# Patient Record
Sex: FEMALE | Race: WHITE | NOT HISPANIC OR LATINO | Employment: OTHER | URBAN - METROPOLITAN AREA
[De-identification: names, ages, dates, MRNs, and addresses within clinical notes are randomized per-mention and may not be internally consistent; named-entity substitution may affect disease eponyms.]

---

## 2024-07-18 PROBLEM — M06.9 RHEUMATOID ARTHRITIS: Status: ACTIVE | Noted: 2024-07-18

## 2024-07-22 ENCOUNTER — OFFICE VISIT (OUTPATIENT)
Age: 79
End: 2024-07-22
Payer: COMMERCIAL

## 2024-07-22 VITALS
HEIGHT: 63 IN | BODY MASS INDEX: 32.85 KG/M2 | TEMPERATURE: 97.2 F | HEART RATE: 73 BPM | DIASTOLIC BLOOD PRESSURE: 62 MMHG | SYSTOLIC BLOOD PRESSURE: 106 MMHG | WEIGHT: 185.4 LBS

## 2024-07-22 DIAGNOSIS — Z79.899 HIGH RISK MEDICATION USE: ICD-10-CM

## 2024-07-22 DIAGNOSIS — M06.0A RHEUMATOID ARTHRITIS OF OTHER SITE WITH NEGATIVE RHEUMATOID FACTOR: Primary | ICD-10-CM

## 2024-07-22 DIAGNOSIS — M85.89 OSTEOPENIA OF MULTIPLE SITES: ICD-10-CM

## 2024-07-22 DIAGNOSIS — N28.9 RENAL INSUFFICIENCY: ICD-10-CM

## 2024-07-22 NOTE — ASSESSMENT & PLAN NOTE
Orencia -- well tolerated and effective.  CXR normal 6/2017.  QFN  negative 10/2022  S/p Covid vaccines + 1 booster. S/p bivalent. Fall 23 booster    Plan:  CBC, CMP every 4 months 3/24    Would hold Orencia for any infection or illness, Seek treatment and when well and illness is resolved you may restart medication.  Hold Orencia 2 weeks after covid booster.

## 2024-07-22 NOTE — ASSESSMENT & PLAN NOTE
2/17 DXA was stable except femoral neck had decreased bone density by 8.8%  ? T11 height decrease by the FA vs artifact; previous Tspine film had been negative.'  Dexa 4/27/21 L femoral neck -1.6 decreased 5% , Total Hip 0.4 increased 1.4%  Spine average of L1 and L4 is -1.3 but not useful secondary to limited vertebra. L forearm 1/3 value -0.7 , total -0.8- stable.    Plan:    Recommend Calcium 800-1000mg per day + 2000 Vit D per day  Weight bearing exercise-  possibly try chair aerobics or chair yoga on YouTube.  She is walking for exercise.   Repeat DEXA here on RTC.

## 2024-07-22 NOTE — ASSESSMENT & PLAN NOTE
S/p Enbrel  weekly and MTX 4 once weekly - tapered to 2 MTX per week.  NO MORE TNF DRUGS- Patient has idiopathic cardiomyopathy.  Doing well without sx.    Plan:  Continue Orencia Sub Q injections

## 2024-07-22 NOTE — PROGRESS NOTES
[facility] Follow Up Visit     Office Follow Up      Date: 07/22/2024   Patient Name: Lydia Smallwood  MRN: [id number]  YOB: 1945    Referring Physician: Jere Santos MD     Chief Complaint   Patient presents with    Follow-up       History of Present Illness: Lydia Smallwood is a 79 y.o. female who is here today for follow up on   History of Present Illness  The patient is a 79-year-old female who is here for follow-up of rheumatoid arthritis.    The patient reports a pain level of 3 out of 10, predominantly localized in her hands. She experiences morning stiffness lasting approximately 5 minutes. Overall, she perceives her rheumatoid arthritis to be well-managed. She self-administers Orencia injections, which she tolerates well. She infrequently uses tramadol for pain management, typically for hip pain, as prescribed by Dr. Emerson. She is due for a bone density scan and maintains a regimen of calcium and vitamin D supplements. Her mobility is limited, with good and bad days. She engages in regular walking exercises. She requires a prescription for Orencia. She reports no adverse effects from Enbrel.    The patient has recently developed a cough, suspected to be a result of heart failure. She is scheduled for an ultrasound of her kidneys due to elevated levels during her consultation with Dr. Emerson. Her cardiologist, Dr. Wong, has expressed concerns about potential kidney complications.       Result Review :        Results  Laboratory Studies  Creatinine was 1.32. GFR was 41.          Subjective     Allergies   Allergen Reactions    Penicillins Unknown - High Severity    Sulfa Antibiotics Unknown - High Severity         Current Outpatient Medications:     Abatacept (Orencia ClickJect) 125 MG/ML solution auto-injector, Inject 1 mL under the skin into the appropriate area as directed Every 7 (Seven) Days. REFRIGERATE. ALLOW TO WARM TO  ROOM TEMPERATURE PRIOR TO ADMINISTRATION., Disp: , Rfl:     aspirin 81 MG EC tablet, Take 1 tablet by mouth Daily., Disp: , Rfl:     carvedilol (COREG) 12.5 MG tablet, Take 1 tablet by mouth 2 (Two) Times a Day With Meals., Disp: , Rfl:     Cholecalciferol (Vitamin D3) 10 MCG (400 UNIT) chewable tablet, Chew 1 tablet Daily., Disp: , Rfl:     Evolocumab (Repatha SureClick) solution auto-injector SureClick injection, Inject 1 mL under the skin into the appropriate area as directed Every 14 (Fourteen) Days. in the abdomen, thigh, or outer area of upper arm (rotate sites), Disp: , Rfl:     levothyroxine (SYNTHROID, LEVOTHROID) 50 MCG tablet, Take 1 tablet by mouth Daily., Disp: , Rfl:     nitroglycerin (NITROSTAT) 0.4 MG SL tablet, Place 1 tablet under the tongue Every 5 (Five) Minutes As Needed for Chest Pain. Take no more than 3 doses in 15 minutes., Disp: , Rfl:     omeprazole (priLOSEC) 40 MG capsule, Take 1 capsule by mouth Every Morning Before Breakfast., Disp: , Rfl:     sacubitril-valsartan (Entresto) 24-26 MG tablet, Take 1 tablet by mouth 2 (Two) Times a Day., Disp: , Rfl:     spironolactone (ALDACTONE) 25 MG tablet, Take 1 tablet by mouth Daily., Disp: , Rfl:     torsemide (DEMADEX) 10 MG tablet, Take 1 tablet by mouth Daily As Needed., Disp: , Rfl:     traMADol (ULTRAM) 50 MG tablet, Take 1 tablet by mouth Daily As Needed., Disp: , Rfl:     Triamcinolone Acetonide powder, Use., Disp: , Rfl:     Past Medical History:   Diagnosis Date    Abnormal laboratory test     CAD (coronary artery disease)     Degenerative arthritis     Dyspepsia     Fatigue     High risk medication use     Hyperlipidemia     Hypothyroid     Idiopathic cardiomyopathy     02/21/2019 -EF 45% ( ?? FH with grandmother and father). No CHF. Positive mitral regurgitation. Dr. Cardenas.    Myofascial pain     SG 10/11/2017 -Resolved    Nonspecific abnormal results of function study of liver     SG 10/11/2017 -Resolved    Osteopenia     Pes  planovalgus     Pneumonia     SG 10/11/2017 -Inactive    Posterior tibial tendonitis     Rheumatoid arthritis     Shingles 11/19/2021        Past Surgical History:   Procedure Laterality Date    CATARACT EXTRACTION Left 2023    ICD GENERATOR REPLACEMENT  10/29/2021    TOTAL HIP ARTHROPLASTY         Family History   Problem Relation Age of Onset    Lung cancer Mother     Heart disease Brother         Social History     Socioeconomic History    Marital status:     Number of children: 3   Tobacco Use    Smoking status: Never   Substance and Sexual Activity    Alcohol use: Never    Drug use: Never    Sexual activity: Defer       Review of Systems   Constitutional:  Positive for fatigue. Negative for fever.   HENT:  Negative for mouth sores, nosebleeds, swollen glands and trouble swallowing.    Eyes:  Negative for blurred vision, double vision, photophobia, pain and visual disturbance.   Respiratory:  Positive for cough and shortness of breath. Negative for apnea and choking.    Cardiovascular:  Negative for chest pain, palpitations and leg swelling.        Swelling   Gastrointestinal:  Negative for abdominal pain, blood in stool, constipation, diarrhea, nausea, vomiting and GERD.   Endocrine: Positive for heat intolerance. Negative for cold intolerance, polydipsia, polyphagia and polyuria.   Genitourinary:  Negative for difficulty urinating, dysuria, genital sores, hematuria and urinary incontinence.   Musculoskeletal:  Negative for arthralgias, back pain, gait problem, joint swelling, myalgias, neck pain, neck stiffness and bursitis.   Skin:  Negative for rash.   Allergic/Immunologic: Negative for environmental allergies and food allergies.   Neurological:  Negative for dizziness, tremors, seizures, syncope, weakness, numbness, headache, memory problem and confusion.   Hematological:  Negative for adenopathy. Does not bruise/bleed easily.   Psychiatric/Behavioral:  Negative for sleep disturbance, suicidal ideas,  "depressed mood and stress. The patient is not nervous/anxious.         I have reviewed and updated the patient's chief complaint, history of present illness, review of systems, past medical history, surgical history, family history, social history, medications and allergy list as appropriate.     Objective    Vital Signs:   Vitals:    07/22/24 1157   BP: 106/62   Pulse: 73   Temp: 97.2 °F (36.2 °C)   Weight: 84.1 kg (185 lb 6.4 oz)   Height: 160 cm (63\")   PainSc:   3   PainLoc: Hand     Lydia Smallwood reports a pain score of 3.  Given her pain assessment as noted, treatment options were discussed and the following options were decided upon as a follow-up plan to address the patient's pain: .      Body mass index is 32.84 kg/m².        Physical Exam   Physical Exam  Patient is an alert female, no acute distress.  Head is atraumatic, normocephalic. Pupils are round and equal. Extraocular muscles intact. Oropharynx looks negative except her tongue is slightly dry today.  Heart is regular without rubs, gallops, or murmurs.  Mild thoracic kyphosis is present. Spine and muscles are nontender. Shoulders are nontender with good range of motion. Few Heberden's nodes are present on the right hand and some mild first CMC squaring, but no synovitis or tenderness. Left hand is the same. First MTP thickening with hallux deformity bilaterally in the feet. Abduction and external rotation of the left hip causes mild pain.  Skin is negative other than some evidence of past sun exposure.    Physical Exam  There is currently no information documented on the homunculus. Go to the Rheumatology activity and complete the homunculus joint exam.     Results Review:   Imaging Results (Last 24 Hours)       ** No results found for the last 24 hours. **            Procedures    Assessment / Plan    Assessment/Plan:   Diagnoses and all orders for this visit:    1. Rheumatoid arthritis of other site with negative rheumatoid factor " (Primary)  Assessment & Plan:  S/p Enbrel  weekly and MTX 4 once weekly - tapered to 2 MTX per week.  NO MORE TNF DRUGS- Patient has idiopathic cardiomyopathy.  Doing well without sx.    Plan:  Continue Orencia Sub Q injections          2. Renal insufficiency  Assessment & Plan:  Since 2018 has ranged up to 1.37/40 in 2/23. ? related to diabetes, HTN, dieretic use.  Cr 1.37/40 in 2/23  Cr 0.99/59 in 6/23  Cr 1.20/47 in 11/23  Cr 1.12/50 in 3/24    Plan:  It may be prudent for her to see nephrology for baseline eval. PCP felt she did not need nephrology eval at this time.   Improved      3. Osteopenia of multiple sites  Assessment & Plan:  2/17 DXA was stable except femoral neck had decreased bone density by 8.8%  ? T11 height decrease by the FA vs artifact; previous Tspine film had been negative.'  Dexa 4/27/21 L femoral neck -1.6 decreased 5% , Total Hip 0.4 increased 1.4%  Spine average of L1 and L4 is -1.3 but not useful secondary to limited vertebra. L forearm 1/3 value -0.7 , total -0.8- stable.    Plan:    Recommend Calcium 800-1000mg per day + 2000 Vit D per day  Weight bearing exercise-  possibly try chair aerobics or chair yoga on YouTube.  She is walking for exercise.   Repeat DEXA here on RTC.            4. High risk medication use  Assessment & Plan:  Orencia -- well tolerated and effective.  CXR normal 6/2017.  QFN  negative 10/2022  S/p Covid vaccines + 1 booster. S/p bivalent. Fall 23 booster    Plan:  CBC, CMP every 4 months 3/24    Would hold Orencia for any infection or illness, Seek treatment and when well and illness is resolved you may restart medication.  Hold Orencia 2 weeks after covid booster.             Assessment & Plan  1. Rheumatoid arthritis.  The patient's condition is currently stable. Continuation of Orencia subcutaneously is recommended.    2. Renal insufficiency.  Upon reviewing her 07/2023 lab results, it was noted that her creatinine level had increased to 1.3. Her primary care  physician has recommended an ultrasound and consultation with nephrology.    3. Osteopenia.  Continuation of calcium 800 to 1000 mg/day and 2000 units of vitamin D/day is recommended. Weightbearing exercise is currently challenging due to her hip condition. A DEXA scan is scheduled for the near future.    4. High-risk medication.  The Orencia is well tolerated and effective. QTB is current as of 07/2024. She plans to undergo CBC and CMP every 4 months, which is current as of 07/24/2024. Orencia will be held for any infection or illness and seek treatment. Once well and the illness is resolved, she may resume medication. It is recommended that she hold Orencia 1 to 2 weeks before and 2 weeks after surgery.    Follow-up  A follow-up appointment is scheduled for 4 months from now, with updated labs to be conducted prior to the visit.        Follow Up:   No follow-ups on file.         Mavis Jenkins MD  [facility]     Encounter Administratively Closed by Health Information Management

## 2024-07-22 NOTE — ASSESSMENT & PLAN NOTE
Since 2018 has ranged up to 1.37/40 in 2/23. ? related to diabetes, HTN, dieretic use.  Cr 1.37/40 in 2/23  Cr 0.99/59 in 6/23  Cr 1.20/47 in 11/23  Cr 1.12/50 in 3/24    Plan:  It may be prudent for her to see nephrology for baseline eval. PCP felt she did not need nephrology eval at this time.   Improved

## 2024-07-23 ENCOUNTER — TELEPHONE (OUTPATIENT)
Age: 79
End: 2024-07-23
Payer: COMMERCIAL

## 2024-07-23 ENCOUNTER — SPECIALTY PHARMACY (OUTPATIENT)
Age: 79
End: 2024-07-23
Payer: COMMERCIAL

## 2024-07-23 RX ORDER — ABATACEPT 125 MG/ML
125 INJECTION, SOLUTION SUBCUTANEOUS
Qty: 3.92 ML | Status: CANCELLED | OUTPATIENT
Start: 2024-07-23

## 2024-07-23 RX ORDER — ABATACEPT 125 MG/ML
125 INJECTION, SOLUTION SUBCUTANEOUS
Qty: 4 ML | Refills: 3 | Status: SHIPPED | OUTPATIENT
Start: 2024-07-23

## 2024-07-23 NOTE — TELEPHONE ENCOUNTER
PHARMACY IS CALLING TO REQUEST A REFILL ON PTS ABATACEPT (ORENCIA CLICKJECT) 125 MG/ML SOLUTION AUTO-INJECTOR.     PHARMACY ON FILE     PHARMACY PHONE   [phone]    PHARMACY FAX  [phone]

## 2024-11-14 ENCOUNTER — TELEPHONE (OUTPATIENT)
Age: 79
End: 2024-11-14
Payer: COMMERCIAL

## 2024-11-14 NOTE — TELEPHONE ENCOUNTER
PT HAD TO BE CANCELLED DUE TO A PROVIDER EMERGENCY. I HAVE REACHED OUT VIA [employer], [employer] AND PHONE. IF PT CALLS BACK TO RESCHEDULE, PLEASE SCHEDULE WITH PIYUSH COTTON OR [facility]. IF THE PT WANTS TO SEE DR. CONTRERAS, SHE IS BOOKED UNTIL MARCH. IF IT IS A NEW PT, CONFRIM IF THEY ARE INTRESTED IN SEEING ANOTHER PROVIDER OR IF THEY WANT TO WAIT FOR DR. CONTRERAS. PT HAS BEEN ADDED TO THE CANCELLATION LIST AS HIGH PRIORITY.         -HUB READY TO SCHEDULE.

## 2024-12-03 RX ORDER — ABATACEPT 125 MG/ML
INJECTION, SOLUTION SUBCUTANEOUS
Qty: 4 EACH | Refills: 5 | Status: SHIPPED | OUTPATIENT
Start: 2024-12-03

## 2025-02-06 NOTE — TELEPHONE ENCOUNTER
Specialty Pharmacy Patient Management Program  Per Protocol Prescription Order/Refill     Patient currently fills medications at [facility] and is not enrolled in an Rheumatology Patient Management Program.     Requested Prescriptions     Signed Prescriptions Disp Refills    Abatacept (Orencia ClickJect) 125 MG/ML solution auto-injector 4 each 5     Sig: INJECT 1 PEN UNDER THE SKIN EVERY 7 DAYS     Authorizing Provider: CHRISTINE CONTRERAS     Ordering User: ADA AVILES     Prescription orders above were sent to the pharmacy per Collaborative Care Agreement Protocol.     Ada Aviles, PharmD, BCPS  Clinical Specialty Pharmacist, Rheumatology   12/3/2024  16:20 EST          
(E4) spontaneous

## 2025-02-20 ENCOUNTER — TELEPHONE (OUTPATIENT)
Age: 80
End: 2025-02-20
Payer: COMMERCIAL

## 2025-02-20 NOTE — TELEPHONE ENCOUNTER
PT APPT HAD TO BE CANCELLED. IF PT CALLS BACK TO RESCHEDULE, PLEASE SCHEDULE WITH PIYUSH COTTON OR [facility]Y. PLEASE ADD PT TO THE CANCELLATION LIST AS NORMAL PRIOTIRY WITH AN END DATE OF 12/31/2025.   -HUB READY TO SCHEDULE.